# Patient Record
Sex: MALE
[De-identification: names, ages, dates, MRNs, and addresses within clinical notes are randomized per-mention and may not be internally consistent; named-entity substitution may affect disease eponyms.]

---

## 2018-11-28 ENCOUNTER — HOSPITAL ENCOUNTER (OUTPATIENT)
Dept: HOSPITAL 88 - ER | Age: 43
Setting detail: OBSERVATION
LOS: 1 days | Discharge: HOME | End: 2018-11-29
Attending: FAMILY MEDICINE | Admitting: FAMILY MEDICINE
Payer: COMMERCIAL

## 2018-11-28 VITALS — SYSTOLIC BLOOD PRESSURE: 141 MMHG | DIASTOLIC BLOOD PRESSURE: 95 MMHG

## 2018-11-28 VITALS — SYSTOLIC BLOOD PRESSURE: 157 MMHG | DIASTOLIC BLOOD PRESSURE: 95 MMHG

## 2018-11-28 VITALS — HEIGHT: 73 IN | BODY MASS INDEX: 31.81 KG/M2 | WEIGHT: 240 LBS

## 2018-11-28 VITALS — DIASTOLIC BLOOD PRESSURE: 94 MMHG | SYSTOLIC BLOOD PRESSURE: 160 MMHG

## 2018-11-28 VITALS — DIASTOLIC BLOOD PRESSURE: 106 MMHG | SYSTOLIC BLOOD PRESSURE: 166 MMHG

## 2018-11-28 VITALS — SYSTOLIC BLOOD PRESSURE: 154 MMHG | DIASTOLIC BLOOD PRESSURE: 92 MMHG

## 2018-11-28 VITALS — DIASTOLIC BLOOD PRESSURE: 97 MMHG | SYSTOLIC BLOOD PRESSURE: 155 MMHG

## 2018-11-28 VITALS — DIASTOLIC BLOOD PRESSURE: 85 MMHG | SYSTOLIC BLOOD PRESSURE: 131 MMHG

## 2018-11-28 DIAGNOSIS — Z82.49: ICD-10-CM

## 2018-11-28 DIAGNOSIS — J06.9: ICD-10-CM

## 2018-11-28 DIAGNOSIS — I10: ICD-10-CM

## 2018-11-28 DIAGNOSIS — R00.2: Primary | ICD-10-CM

## 2018-11-28 DIAGNOSIS — E66.9: ICD-10-CM

## 2018-11-28 DIAGNOSIS — R51: ICD-10-CM

## 2018-11-28 DIAGNOSIS — E78.5: ICD-10-CM

## 2018-11-28 DIAGNOSIS — R07.9: ICD-10-CM

## 2018-11-28 LAB
ALBUMIN SERPL-MCNC: 4.4 G/DL (ref 3.5–5)
ALBUMIN/GLOB SERPL: 1.3 {RATIO} (ref 0.8–2)
ALP SERPL-CCNC: 60 IU/L (ref 40–150)
ALT SERPL-CCNC: 23 IU/L (ref 0–55)
AMPHETAMINES UR QL SCN>1000 NG/ML: NEGATIVE
ANION GAP SERPL CALC-SCNC: 15.7 MMOL/L (ref 8–16)
BACTERIA URNS QL MICRO: (no result) /HPF
BASOPHILS # BLD AUTO: 0 10*3/UL (ref 0–0.1)
BASOPHILS NFR BLD AUTO: 0.3 % (ref 0–1)
BENZODIAZ UR QL SCN: NEGATIVE
BILIRUB UR QL: NEGATIVE
BUN SERPL-MCNC: 15 MG/DL (ref 7–26)
BUN/CREAT SERPL: 19 (ref 6–25)
CALCIUM SERPL-MCNC: 9.5 MG/DL (ref 8.4–10.2)
CHLORIDE SERPL-SCNC: 106 MMOL/L (ref 98–107)
CK MB SERPL-MCNC: 1.4 NG/ML (ref 0–5)
CK MB SERPL-MCNC: 2.1 NG/ML (ref 0–5)
CK MB SERPL-MCNC: 2.3 NG/ML (ref 0–5)
CK SERPL-CCNC: 130 IU/L (ref 30–200)
CK SERPL-CCNC: 171 IU/L (ref 30–200)
CK SERPL-CCNC: 95 IU/L (ref 30–200)
CLARITY UR: CLEAR
CO2 SERPL-SCNC: 23 MMOL/L (ref 22–29)
COLOR UR: YELLOW
DEPRECATED FTI SERPL-MCNC: 2.18 UG/DL (ref 1.4–3.8)
DEPRECATED NEUTROPHILS # BLD AUTO: 4.5 10*3/UL (ref 2.1–6.9)
DEPRECATED RBC URNS MANUAL-ACNC: (no result) /HPF (ref 0–5)
EGFRCR SERPLBLD CKD-EPI 2021: > 60 ML/MIN (ref 60–?)
EOSINOPHIL # BLD AUTO: 0.2 10*3/UL (ref 0–0.4)
EOSINOPHIL NFR BLD AUTO: 2.3 % (ref 0–6)
EPI CELLS URNS QL MICRO: (no result) /LPF
ERYTHROCYTE [DISTWIDTH] IN CORD BLOOD: 12.5 % (ref 11.7–14.4)
GLOBULIN PLAS-MCNC: 3.3 G/DL (ref 2.3–3.5)
GLUCOSE SERPLBLD-MCNC: 144 MG/DL (ref 74–118)
HCT VFR BLD AUTO: 44.9 % (ref 38.2–49.6)
HGB BLD-MCNC: 15.7 G/DL (ref 14–18)
KETONES UR QL STRIP.AUTO: NEGATIVE
LEUKOCYTE ESTERASE UR QL STRIP.AUTO: NEGATIVE
LYMPHOCYTES # BLD: 3.5 10*3/UL (ref 1–3.2)
LYMPHOCYTES NFR BLD AUTO: 40 % (ref 18–39.1)
MCH RBC QN AUTO: 31.7 PG (ref 28–32)
MCHC RBC AUTO-ENTMCNC: 35 G/DL (ref 31–35)
MCV RBC AUTO: 90.7 FL (ref 81–99)
MONOCYTES # BLD AUTO: 0.5 10*3/UL (ref 0.2–0.8)
MONOCYTES NFR BLD AUTO: 5.5 % (ref 4.4–11.3)
NEUTS SEG NFR BLD AUTO: 51.4 % (ref 38.7–80)
NITRITE UR QL STRIP.AUTO: NEGATIVE
PCP UR QL SCN: NEGATIVE
PLATELET # BLD AUTO: 319 X10E3/UL (ref 140–360)
POTASSIUM SERPL-SCNC: 3.7 MMOL/L (ref 3.5–5.1)
PROT UR QL STRIP.AUTO: (no result)
RBC # BLD AUTO: 4.95 X10E6/UL (ref 4.3–5.7)
SODIUM SERPL-SCNC: 141 MMOL/L (ref 136–145)
SP GR UR STRIP: 1.02 (ref 1.01–1.02)
TSH SERPL DL<=0.005 MIU/L-ACNC: 2.18 UIU/ML (ref 0.35–4.94)
UROBILINOGEN UR STRIP-MCNC: 0.2 MG/DL (ref 0.2–1)
WBC #/AREA URNS HPF: (no result) /HPF (ref 0–5)

## 2018-11-28 PROCEDURE — 81001 URINALYSIS AUTO W/SCOPE: CPT

## 2018-11-28 PROCEDURE — 70551 MRI BRAIN STEM W/O DYE: CPT

## 2018-11-28 PROCEDURE — 84436 ASSAY OF TOTAL THYROXINE: CPT

## 2018-11-28 PROCEDURE — 82553 CREATINE MB FRACTION: CPT

## 2018-11-28 PROCEDURE — 85025 COMPLETE CBC W/AUTO DIFF WBC: CPT

## 2018-11-28 PROCEDURE — 36415 COLL VENOUS BLD VENIPUNCTURE: CPT

## 2018-11-28 PROCEDURE — 80061 LIPID PANEL: CPT

## 2018-11-28 PROCEDURE — 83036 HEMOGLOBIN GLYCOSYLATED A1C: CPT

## 2018-11-28 PROCEDURE — 82550 ASSAY OF CK (CPK): CPT

## 2018-11-28 PROCEDURE — 84484 ASSAY OF TROPONIN QUANT: CPT

## 2018-11-28 PROCEDURE — 80307 DRUG TEST PRSMV CHEM ANLYZR: CPT

## 2018-11-28 PROCEDURE — 71046 X-RAY EXAM CHEST 2 VIEWS: CPT

## 2018-11-28 PROCEDURE — 80053 COMPREHEN METABOLIC PANEL: CPT

## 2018-11-28 PROCEDURE — 93005 ELECTROCARDIOGRAM TRACING: CPT

## 2018-11-28 PROCEDURE — 84443 ASSAY THYROID STIM HORMONE: CPT

## 2018-11-28 PROCEDURE — 99284 EMERGENCY DEPT VISIT MOD MDM: CPT

## 2018-11-28 PROCEDURE — 80048 BASIC METABOLIC PNL TOTAL CA: CPT

## 2018-11-28 PROCEDURE — 93306 TTE W/DOPPLER COMPLETE: CPT

## 2018-11-28 PROCEDURE — 84479 ASSAY OF THYROID (T3 OR T4): CPT

## 2018-11-28 RX ADMIN — AMLODIPINE BESYLATE SCH MG: 5 TABLET ORAL at 09:06

## 2018-11-28 RX ADMIN — Medication PRN MG: at 19:36

## 2018-11-28 RX ADMIN — Medication PRN MG: at 07:21

## 2018-11-28 RX ADMIN — ASPIRIN SCH MG: 81 TABLET, COATED ORAL at 09:06

## 2018-11-28 NOTE — CONSULTATION
DATE OF CONSULTATION:  2018 



CARDIOLOGY CONSULTATION



REASON FOR CONSULTATION:  Palpitations.



HISTORY OF PRESENT ILLNESS:  Mr. Roper is a 42-year-old gentleman with a 

past medical history of hypertension, obesity, who presents to this 

institution with malaise, right retroorbital headache and subjective 

palpitations. Patient reports that about 3 days ago he came down with upper 

respiratory tract infection-type symptoms which he got from his wife. He 

reports currently having complete nasal stuffiness associated with some 

sore throat and some slight headache. He has been taking p.r.n. ibuprofen 

for it. Patient reports that last night he lay down in bed and was suddenly 

awoken from his sleep with a warmth sensation and subjective palpitations 

and got anxious. He reports that he was frightened and woke up his wife and 

told her to bring him to the emergency room. His wife reports that at 

baseline he is not very much a complainer and decided to bring him in. He 

has reported in the last day or 2 having episodes of when he goes to sleep 

he is suddenly woken up with fits of shortness of breath with heart racing 

lasting for a couple of seconds and then largely resolves. He also is 

endorsing some headache predominantly behind his right eye area with a 

little bit of eye tearing. In the hospital he says that he has had more of 

these palpitation spells. Again they are all associated just when he is 

either going to sleep or is sleeping and is suddenly awoken with shortness 

of breath and heavy beats lasting seconds. He denies any syncope or near 

syncope. In terms of his childhood, he has been always very healthy and 

active. He denies any family history of sudden cardiac death or rhythm 

disorders. He otherwise states that he is in relatively good health.



PAST MEDICAL HISTORY

1. Hypertension, essential.

2. History of left knee torn meniscus.



PAST SURGICAL HISTORY:  History of cholecystectomy.



FAMILY HISTORY:  Father alive, has hypertension. Mother alive and well. Has 

a grandfather who  in his 50s with heart problems.



SOCIAL HISTORY:  He reports occasional alcohol on the weekends with last 

use on Monday night for Monday Night Football, which was 2 days ago. Denies 

any illicit drug use.



ALLERGIES:  NO KNOWN DRUG ALLERGIES.



HOME MEDICATIONS:  Include Norvasc 5 mg daily, losartan unknown dose, and 

multivitamin tablet.



REVIEW OF SYSTEMS

GENERAL:  Positive for low-grade fevers, chills, malaise. Denies any weight 

loss or weight gain.

HEENT:  Has nasal stuffiness. Has difficulty breathing through his nose. 

Has occasional headache as noted above. Has a little bit of sore throat.

RESPIRATORY:  Denies any pleuritic chest pain. Has a nonproductive cough. 

No wheezing.

CARDIOVASCULAR:  Denies any angina or overt chest pain. Positive for 

palpitations as noted above. Denies any syncope.

GI:  Denies any abdominal pain, bright red blood per rectum, melena, 

hematemesis.

:  Denies any dysuria, pyuria or any change in urinary frequency.

MUSCULOSKELETAL:  Has occasional left knee pain but no leg swelling.

ENDOCRINE:  Denies any heat or cold intolerance.

NEUROLOGIC:  Denies any focal weakness, numbness, tingling, seizures. Does 

report right-sided temporal headache. Denies any history of TIA or stroke. 

REMAINDER:  Negative otherwise mentioned.



PHYSICAL EXAMINATION

VITAL SIGNS:  Height of 73 inches, weight of 240 pounds. BMI is 31.7. 

Temperature 97.0, pulse of 74, respiratory rate of 18, blood pressure 

141/95, O2 sat 98% on room air.

IN GENERAL:  This is a well-nourished, well-developed gentleman who appears 

a little bit ill due to his upper respiratory tract infection.

HEENT:  Pupils are equally round and reactive to light. Extraocular 

movements are intact. Oropharynx is clear. He is definitely nasally and 

congested-sounding.

NECK:  No elevation of jugular venous pulsation. No carotid bruits. 

CARDIOVASCULAR:  Regular in rate and rhythm. Normal S1 and S2. No gallops, 

murmurs, rubs. 

LUNGS:  Largely clear to auscultation bilaterally with good air entry.

ABDOMEN:  Soft, nontender and nondistended. Has normoactive bowel sounds.

BACK:  No costovertebral angle tenderness.

EXTREMITIES:  Warm with 2+ bilateral radial pulses, 2+ bilateral femoral 

pulses and 2+ pedal pulses.

NEUROLOGIC:  Cranial nerves 2-12 are intact, strength is 5/5, and is 

grossly nonfocal. 

PSYCH:  Normal fluent speech, appropriate affect. A little bit of anxiety 

but no depression.



LABS:  White count 8.7, hemoglobin 15.7, hematocrit 44.9, platelets of 319. 

Sodium 141, potassium 3.7, chloride 106, bicarb 23, BUN 15, creatinine 

0.79, glucose of 144. Hemoglobin A1c is 5.0%. Calcium of 9.5. AST 17, ALT 

23, alk phos 60, total bilirubin 1.5. Total protein 7.7, albumin of 4.4. 

TSH is 2.178. Troponin went from 0.014 to 0.013. UDS is negative. UA shows 

0-5 white cells. 



DIAGNOSTICS:  EKG reveals sinus rhythm, incomplete right bundle branch 

block and no ST-T wave changes. Chest x-ray reveals no acute thoracic 

abnormalities.



DIAGNOSES  

1. Definite upper respiratory tract infection.

2. Subjective palpitations, luckily without any alarm symptoms. 

3. Obesity.

4. Suspected sleep apnea.

5. Headache.

6. Hypertension.



PLAN/RECOMMENDATIONS

1. From a cardiovascular standpoint, I have reviewed his telemetry 

extensively and we see no signs of any arrhythmias noted to corroborate 

his subjective complaints and historical complaints.

2. His echocardiogram was reviewed, showing structurally normal heart 

with EF preserved at 60% to 65% with normal diastolic function and no 

valvular abnormality.

3. Suspect patient may have underlying sleep apnea, does snore pretty 

loud per wife, and in light of his complete nasal blockage from his 

upper respiratory tract infection patient may be having episodes where 

he is gasping for air and in that setting is noted to be slightly with 

subjective palpitations and maybe tachycardia.

4. Do not necessarily advocate for any changes in pharmacotherapy.

5. I had a long discussion with the patient in terms of differential 

diagnosis.

6. Will follow up on imaging study of the head per primary team.

7. I had a long discussion with the patient including findings.

8. I offered stress test. He says he wants to perhaps pursue this as an 

outpatient, which is not unreasonable.





 





DD:  2018 12:30

DT:  2018 13:29

Job#:  [id number]

## 2018-11-28 NOTE — DIAGNOSTIC IMAGING REPORT
MRI BRAIN WO



HISTORY: Left arm tingling



COMPARISON:  None.



TECHNIQUE:

Sagittal T2, axial T2, axial T1, axial T2/FLAIR, axial gradient echo (or

susceptibility weighted), coronal T2/FLAIR, and axial diffusion weighted MR

images of the brain were obtained without contrast. Motion artifacts obscure

some details.



DISCUSSION:



Scalp/bone marrow: Unremarkable.

Brain sulci: Appropriate for patient's age.

Ventricles:  Normal in size and configuration. No hydrocephalus.

Extra-axial spaces: No masses or fluid collections.



Parenchyma:

A few small T2/FLAIR hyperintense foci are seen in the bifrontal deep white

matter.

Otherwise, no mass, hemorrhage, or acute vascular insults.



Vessels: Normal flow voids in major arteries and veins.

Sellar/Suprasellar region:  No abnormalities.

Craniocervical junction: No abnormalities.

Incidental findings: There is minimal T2 hyperintense mucosal thickening in the

left maxillary sinus.



IMPRESSION:

 

1.  No acute intracranial abnormalities.

2.  Few nonspecific small T2/FLAIR hyperintense foci in the bifrontal deep

white matter.



Signed by: Dr. Dannie Amato M.D. on 11/28/2018 5:02 PM

## 2018-11-28 NOTE — XMS REPORT
Patient Summary Document

                             Created on: 2018



CYNTHIA VERA

External Reference #: [id number]

: 1975

Sex: Male



Demographics







                          Address                   [address] [place] [address]

 

                          Home Phone                [phone]

 

                          Preferred Language        Unknown

 

                          Marital Status            Unknown

 

                          [employer] Affiliation     Unknown

 

                          Race                      Unknown

 

                                        Additional Race(s)  

 

                          Ethnic Group              Unknown





Author







                          Author                    [facility]

 

                          [facility]

 

                          Address                   Unknown

 

                          Phone                     Unavailable







Care Team Providers







                    Care Team Member Name    Role                Phone

 

                    BEATRICE QUILES    Unavailable         Unavailable







Problems

This patient has no known problems.



Allergies, Adverse Reactions, Alerts

This patient has no known allergies or adverse reactions.



Medications

This patient has no known medications.



Results







           Test Description    Test Time    Test Comments    Text Results    Atomic Results    Result

 Comments

 

                CHEST 2 VIEWS    2018 01:34:00                                                             

                                             [facility]  
                     [address] [place] [address]  
   Patient Name: CYNTHIA VERA                                   MR #: 
[id number]                     : 1975                                  
Age/Sex: 42/M  Acct #: [id number]                              Req #: [id number]
[id number]  Adm Physician:                                                      
Ordered by: KADE QUILES MD                            Report #: [id number]
[id number]        Location: ER                                      Room/Bed:         
           
___________________________________________________________________________________________________
   Procedure: [id number] DX/CHEST 2 VIEWS  Exam Date: 18                   
        Exam Time: 0120                                              REPORT 
STATUS: Signed    EXAM: CHEST 2 VIEWS, PA and lateral   INDICATION: Rapid 
heartbeat   COMPARISON: None      FINDINGS:   LINES/TUBES: None      LUNGS: No 
consolidations or edema.       PLEURA: No effusions or pneumothorax.      HEART 
AND MEDIASTINUM: Normal size and contour.      BONES AND SOFT TISSUES: No acute 
findings.       IMPRESSION:   No acute thoracic abnormality.            Signed 
by: Dr. Jerry Cintron M.D. on 2018 1:41 AM        Dictated By: JERRY CINTRON MD  Electronically Signed By: JERRY CINTRON MD on 18  
Transcribed By: CRUZ on 18       COPY TO:   KADE QUILES MD

## 2018-11-28 NOTE — DIAGNOSTIC IMAGING REPORT
EXAM: CHEST 2 VIEWS, PA and lateral

INDICATION: Rapid heartbeat

COMPARISON: None



FINDINGS:

LINES/TUBES: None



LUNGS: No consolidations or edema. 



PLEURA: No effusions or pneumothorax.



HEART AND MEDIASTINUM: Normal size and contour.



BONES AND SOFT TISSUES: No acute findings. 



IMPRESSION:

No acute thoracic abnormality.







Signed by: Dr. Jael Patrick M.D. on 11/28/2018 1:41 AM

## 2018-11-28 NOTE — HISTORY AND PHYSICAL
The patient comes in with chest pain and palpitations.



HISTORY OF PRESENT ILLNESS:  This is Mr. Roper with a history of 

hypertension was in his usual state of health until yesterday morning.  The 

patient was found with palpitations, racing heart.  The patient got nervous 

and started with a headache.  Headache described as 9/10 in intensity and 

also on the right side.  The patient had slight chest pain present, 

non-pressure, but with more palpitations.  The patient almost had a near 

syncopal episode.  The patient came into the emergency room and was 

admitted for palpitations and also for headache.  



PAST MEDICAL HISTORY:  History of hypertension.  The patient takes 

amlodipine 5 mg and also multivitamins every day.



SURGICAL HISTORY:  History of torn meniscus in the left knee and also 

history of gallbladder removal.



FAMILY HISTORY:  Positive for hypertension in father.  Otherwise negative.



SOCIAL HISTORY:  No ETOH.  No IV drug abuse.  No history of smoking either.



REVIEW OF SYSTEMS:  Positive for chest pain.  Positive for palpitations.  

No nausea, vomiting or diarrhea.  No constipation.  No rectal bleeding, 

hematochezia.  Positive for headache. No diplopia.  No focal deficit also 

noted.  



PHYSICAL EXAMINATION 

VITAL SIGNS:  Temperature is normal, 97.7, blood pressure is 166/106, pulse 

is 71, and he is on room air, respirations of 19.

HEENT:  Normocephalic and atraumatic.  Pupils reactive to light and 

accommodation. 

CV:  S1 and S2 normal.  Regular rate and rhythm. 

ABDOMEN:  Nontender and nondistended.

EXTREMITIES:  No clubbing.  No cyanosis.  No edema.





LABORATORY VALUES:  Initial white count is 8.7 with lymphocyte predominance 

of 40.  Chemistry:  Sodium is 141, potassium 3.7, glucose is 144, total 

bilirubin of 1.5.  Toxicology was negative.  Urine showed 6-10 rbcs.



IMAGING STUDIES:  Chest x-ray was done which showed no acute thoracic 

abnormalities.



ASSESSMENT 

1.  Palpitations.

2.  Headaches.

3.  Hypertension.

4.  Chest pain.



PLAN:  Continue with his home medications at this time.  Pulse rate has 

been normal.  Echocardiogram will be ordered.  A thyroid panel will be 

ordered.  MRI of the brain will be ordered for the headaches too.  Further 

recommendations per clinical course.  The patient will be monitored here in 

the tele monitoring.  The patient is in sinus rhythm at this time.  Further 

recommendations per clinical course, and also depending on the lab and 

depending on cardiology consult.  

  









DD:  11/28/2018 07:14

DT:  11/28/2018 07:29

Job#:  [id number]

## 2018-11-29 VITALS — SYSTOLIC BLOOD PRESSURE: 137 MMHG | DIASTOLIC BLOOD PRESSURE: 93 MMHG

## 2018-11-29 VITALS — SYSTOLIC BLOOD PRESSURE: 141 MMHG | DIASTOLIC BLOOD PRESSURE: 89 MMHG

## 2018-11-29 VITALS — DIASTOLIC BLOOD PRESSURE: 94 MMHG | SYSTOLIC BLOOD PRESSURE: 160 MMHG

## 2018-11-29 LAB
ANION GAP SERPL CALC-SCNC: 12.6 MMOL/L (ref 8–16)
BASOPHILS # BLD AUTO: 0 10*3/UL (ref 0–0.1)
BASOPHILS NFR BLD AUTO: 0.5 % (ref 0–1)
BUN SERPL-MCNC: 13 MG/DL (ref 7–26)
BUN/CREAT SERPL: 16 (ref 6–25)
CALCIUM SERPL-MCNC: 9.6 MG/DL (ref 8.4–10.2)
CHLORIDE SERPL-SCNC: 104 MMOL/L (ref 98–107)
CHOLEST SERPL-MCNC: 172 MD/DL (ref 0–199)
CHOLEST/HDLC SERPL: 3.4 {RATIO} (ref 3.9–4.7)
CO2 SERPL-SCNC: 26 MMOL/L (ref 22–29)
DEPRECATED NEUTROPHILS # BLD AUTO: 4.5 10*3/UL (ref 2.1–6.9)
EGFRCR SERPLBLD CKD-EPI 2021: > 60 ML/MIN (ref 60–?)
EOSINOPHIL # BLD AUTO: 0.2 10*3/UL (ref 0–0.4)
EOSINOPHIL NFR BLD AUTO: 2.7 % (ref 0–6)
ERYTHROCYTE [DISTWIDTH] IN CORD BLOOD: 12.3 % (ref 11.7–14.4)
GLUCOSE SERPLBLD-MCNC: 110 MG/DL (ref 74–118)
HCT VFR BLD AUTO: 44.7 % (ref 38.2–49.6)
HDLC SERPL-MSCNC: 50 MG/DL (ref 40–60)
HGB BLD-MCNC: 15.6 G/DL (ref 14–18)
LDLC SERPL CALC-MCNC: 73 MG/DL (ref 60–130)
LYMPHOCYTES # BLD: 3.6 10*3/UL (ref 1–3.2)
LYMPHOCYTES NFR BLD AUTO: 41 % (ref 18–39.1)
MCH RBC QN AUTO: 31.6 PG (ref 28–32)
MCHC RBC AUTO-ENTMCNC: 34.9 G/DL (ref 31–35)
MCV RBC AUTO: 90.7 FL (ref 81–99)
MONOCYTES # BLD AUTO: 0.4 10*3/UL (ref 0.2–0.8)
MONOCYTES NFR BLD AUTO: 4.8 % (ref 4.4–11.3)
NEUTS SEG NFR BLD AUTO: 50.7 % (ref 38.7–80)
PLATELET # BLD AUTO: 301 X10E3/UL (ref 140–360)
POTASSIUM SERPL-SCNC: 3.6 MMOL/L (ref 3.5–5.1)
RBC # BLD AUTO: 4.93 X10E6/UL (ref 4.3–5.7)
SODIUM SERPL-SCNC: 139 MMOL/L (ref 136–145)
TRIGL SERPL-MCNC: 244 MG/DL (ref 0–149)

## 2018-11-29 RX ADMIN — Medication PRN MG: at 01:35

## 2018-11-29 RX ADMIN — AMLODIPINE BESYLATE SCH MG: 5 TABLET ORAL at 08:10

## 2018-11-29 RX ADMIN — ASPIRIN SCH MG: 81 TABLET, COATED ORAL at 08:10

## 2018-11-29 NOTE — PROGRESS NOTE
DATE:  November 29, 2018 



Patient had a quiet night, but did complain of some palpitations at 

nighttime, which woke him up.  Corresponding tele strips do not show 

anything.  The patient is alert and oriented times 3 at this time.  No 

complaints.  The patient had an MRI done and echocardiogram done yesterday. 

 MRI was negative.  No acute findings seen.  The echocardiogram shows 

concentric LVH with trace TR and MR.  EF is 55% to 60%.  The patient's TSH 

was normal at 2.178.  T3, T4 and T3 uptake was normal.  



OBJECTIVE 

VITALS:  96.9, pulse of 74, blood pressure is 141/89, SpO2 of 98%.  The 

patient is currently on amlodipine and acetaminophen for headache.  

GENERAL:  Alert and oriented times 3.

LUNGS:  Clear to auscultation bilaterally.  

HEENT:  Normocephalic and atraumatic.  Pupils reactive to light and 

accommodation. 

NECK:  No JVD present.

CARDIOVASCULAR:  S1 and S2 normal.

ABDOMEN:  Nontender and nondistended.

EXTREMITIES:  No clubbing.  No cyanosis.  No edema.

NEUROLOGIC:  Nonfocal.



LABS:  Today's labs, white count was normal at 8.7, hemoglobin is 15.7, 

hematocrit of 44.7.  No left shift present.  Sodium is 139, potassium 3.6, 

GFR was about 60.  TSH as mentioned above.



ASSESSMENT 

1.  Palpitations.

2.  Chest pain.

3.  Hypertension. 

4.  Headache.





The patient has been ruled out for acute coronary syndrome.  His 

palpitations probably is related to sleep apnea, which we discussed with 

Dr. Bey yesterday.  The patient has go into an outpatient sleep study.  

Will send him back to his primary care physician, Dr. Caleb Mon.  

Continue on his home medications at this time.  The patient was asked to 

lose weight for sleep apnea, and also to sleep on his side.  Further 

recommendations as an outpatient.  Medicines on discharge will be as per 

medical reconciliation sheet.  Will fax over reports to Dr. Mon. 









DD:  11/29/2018 07:04

DT:  11/29/2018 07:12

Job#:  [id number]